# Patient Record
Sex: FEMALE | Race: WHITE | NOT HISPANIC OR LATINO | URBAN - METROPOLITAN AREA
[De-identification: names, ages, dates, MRNs, and addresses within clinical notes are randomized per-mention and may not be internally consistent; named-entity substitution may affect disease eponyms.]

---

## 2017-01-11 ENCOUNTER — INPATIENT (INPATIENT)
Facility: HOSPITAL | Age: 82
LOS: 0 days | Discharge: NURSING, REHAB OR LONG-TERM CARE | DRG: 884 | End: 2017-01-12
Attending: FAMILY MEDICINE | Admitting: FAMILY MEDICINE
Payer: COMMERCIAL

## 2017-01-11 VITALS
SYSTOLIC BLOOD PRESSURE: 138 MMHG | HEART RATE: 57 BPM | HEIGHT: 67 IN | TEMPERATURE: 97 F | DIASTOLIC BLOOD PRESSURE: 54 MMHG | RESPIRATION RATE: 16 BRPM | WEIGHT: 156.97 LBS

## 2017-01-11 DIAGNOSIS — J18.9 PNEUMONIA, UNSPECIFIED ORGANISM: ICD-10-CM

## 2017-01-11 DIAGNOSIS — R41.82 ALTERED MENTAL STATUS, UNSPECIFIED: ICD-10-CM

## 2017-01-11 DIAGNOSIS — I10 ESSENTIAL (PRIMARY) HYPERTENSION: ICD-10-CM

## 2017-01-11 DIAGNOSIS — Z29.9 ENCOUNTER FOR PROPHYLACTIC MEASURES, UNSPECIFIED: ICD-10-CM

## 2017-01-11 LAB
ALBUMIN SERPL ELPH-MCNC: 3.2 G/DL — LOW (ref 3.5–5)
ALP SERPL-CCNC: 60 U/L — SIGNIFICANT CHANGE UP (ref 40–120)
ALT FLD-CCNC: 18 U/L DA — SIGNIFICANT CHANGE UP (ref 10–60)
AMMONIA BLD-MCNC: 25 UMOL/L — SIGNIFICANT CHANGE UP (ref 11–32)
ANION GAP SERPL CALC-SCNC: 8 MMOL/L — SIGNIFICANT CHANGE UP (ref 5–17)
APPEARANCE UR: CLEAR — SIGNIFICANT CHANGE UP
APTT BLD: 28.9 SEC — SIGNIFICANT CHANGE UP (ref 27.5–37.4)
AST SERPL-CCNC: 26 U/L — SIGNIFICANT CHANGE UP (ref 10–40)
BASOPHILS # BLD AUTO: 0.1 K/UL — SIGNIFICANT CHANGE UP (ref 0–0.2)
BASOPHILS NFR BLD AUTO: 1.1 % — SIGNIFICANT CHANGE UP (ref 0–2)
BILIRUB SERPL-MCNC: 0.8 MG/DL — SIGNIFICANT CHANGE UP (ref 0.2–1.2)
BILIRUB UR-MCNC: NEGATIVE — SIGNIFICANT CHANGE UP
BUN SERPL-MCNC: 12 MG/DL — SIGNIFICANT CHANGE UP (ref 7–18)
CALCIUM SERPL-MCNC: 8.6 MG/DL — SIGNIFICANT CHANGE UP (ref 8.4–10.5)
CHLORIDE SERPL-SCNC: 93 MMOL/L — LOW (ref 96–108)
CO2 SERPL-SCNC: 28 MMOL/L — SIGNIFICANT CHANGE UP (ref 22–31)
COLOR SPEC: YELLOW — SIGNIFICANT CHANGE UP
CREAT SERPL-MCNC: 0.78 MG/DL — SIGNIFICANT CHANGE UP (ref 0.5–1.3)
DIFF PNL FLD: NEGATIVE — SIGNIFICANT CHANGE UP
EOSINOPHIL # BLD AUTO: 0.1 K/UL — SIGNIFICANT CHANGE UP (ref 0–0.5)
EOSINOPHIL NFR BLD AUTO: 1.2 % — SIGNIFICANT CHANGE UP (ref 0–6)
GLUCOSE SERPL-MCNC: 101 MG/DL — HIGH (ref 70–99)
GLUCOSE UR QL: NEGATIVE — SIGNIFICANT CHANGE UP
HCT VFR BLD CALC: 38.4 % — SIGNIFICANT CHANGE UP (ref 34.5–45)
HGB BLD-MCNC: 12.9 G/DL — SIGNIFICANT CHANGE UP (ref 11.5–15.5)
INR BLD: 1.11 RATIO — SIGNIFICANT CHANGE UP (ref 0.88–1.16)
KETONES UR-MCNC: NEGATIVE — SIGNIFICANT CHANGE UP
LEUKOCYTE ESTERASE UR-ACNC: NEGATIVE — SIGNIFICANT CHANGE UP
LYMPHOCYTES # BLD AUTO: 1.1 K/UL — SIGNIFICANT CHANGE UP (ref 1–3.3)
LYMPHOCYTES # BLD AUTO: 12.7 % — LOW (ref 13–44)
MCHC RBC-ENTMCNC: 28.5 PG — SIGNIFICANT CHANGE UP (ref 27–34)
MCHC RBC-ENTMCNC: 32.3 GM/DL — SIGNIFICANT CHANGE UP (ref 32–36)
MCV RBC AUTO: 88.3 FL — SIGNIFICANT CHANGE UP (ref 80–100)
MONOCYTES # BLD AUTO: 0.7 K/UL — SIGNIFICANT CHANGE UP (ref 0–0.9)
MONOCYTES NFR BLD AUTO: 8.5 % — SIGNIFICANT CHANGE UP (ref 2–14)
NEUTROPHILS # BLD AUTO: 6 K/UL — SIGNIFICANT CHANGE UP (ref 1.8–7.4)
NEUTROPHILS NFR BLD AUTO: 76.9 % — SIGNIFICANT CHANGE UP (ref 43–77)
NITRITE UR-MCNC: NEGATIVE — SIGNIFICANT CHANGE UP
NT-PROBNP SERPL-SCNC: 3084 PG/ML — HIGH (ref 0–450)
OSMOLALITY UR: 329 MOS/KG — SIGNIFICANT CHANGE UP (ref 50–1200)
PH UR: 7 — SIGNIFICANT CHANGE UP (ref 4.8–8)
PLATELET # BLD AUTO: 158 K/UL — SIGNIFICANT CHANGE UP (ref 150–400)
POTASSIUM SERPL-MCNC: 4 MMOL/L — SIGNIFICANT CHANGE UP (ref 3.5–5.3)
POTASSIUM SERPL-SCNC: 4 MMOL/L — SIGNIFICANT CHANGE UP (ref 3.5–5.3)
POTASSIUM UR-SCNC: 39 MMOL/L — SIGNIFICANT CHANGE UP (ref 25–125)
PROT SERPL-MCNC: 7 G/DL — SIGNIFICANT CHANGE UP (ref 6–8.3)
PROT UR-MCNC: 15
PROTHROM AB SERPL-ACNC: 12.5 SEC — SIGNIFICANT CHANGE UP (ref 10–13.1)
RBC # BLD: 4.35 M/UL — SIGNIFICANT CHANGE UP (ref 3.8–5.2)
RBC # FLD: 14.4 % — SIGNIFICANT CHANGE UP (ref 10.3–14.5)
SODIUM SERPL-SCNC: 129 MMOL/L — LOW (ref 135–145)
SODIUM UR-SCNC: 43 MMOL/L — SIGNIFICANT CHANGE UP (ref 40–220)
SP GR SPEC: 1.01 — SIGNIFICANT CHANGE UP (ref 1.01–1.02)
TROPONIN I SERPL-MCNC: <0.015 NG/ML — SIGNIFICANT CHANGE UP (ref 0–0.04)
TSH SERPL-MCNC: 2.67 UU/ML — SIGNIFICANT CHANGE UP (ref 0.34–4.82)
UROBILINOGEN FLD QL: NEGATIVE — SIGNIFICANT CHANGE UP
WBC # BLD: 8.7 K/UL — SIGNIFICANT CHANGE UP (ref 3.8–10.5)
WBC # FLD AUTO: 8.7 K/UL — SIGNIFICANT CHANGE UP (ref 3.8–10.5)

## 2017-01-11 PROCEDURE — 70450 CT HEAD/BRAIN W/O DYE: CPT | Mod: 26

## 2017-01-11 PROCEDURE — 71010: CPT | Mod: 26

## 2017-01-11 PROCEDURE — 99285 EMERGENCY DEPT VISIT HI MDM: CPT

## 2017-01-11 RX ORDER — SIMVASTATIN 20 MG/1
20 TABLET, FILM COATED ORAL AT BEDTIME
Qty: 0 | Refills: 0 | Status: DISCONTINUED | OUTPATIENT
Start: 2017-01-11 | End: 2017-01-12

## 2017-01-11 RX ORDER — DOCUSATE SODIUM 100 MG
100 CAPSULE ORAL
Qty: 0 | Refills: 0 | Status: DISCONTINUED | OUTPATIENT
Start: 2017-01-11 | End: 2017-01-12

## 2017-01-11 RX ORDER — ENOXAPARIN SODIUM 100 MG/ML
40 INJECTION SUBCUTANEOUS DAILY
Qty: 0 | Refills: 0 | Status: DISCONTINUED | OUTPATIENT
Start: 2017-01-11 | End: 2017-01-12

## 2017-01-11 RX ORDER — VALSARTAN 80 MG/1
1 TABLET ORAL
Qty: 0 | Refills: 0 | COMMUNITY

## 2017-01-11 RX ORDER — GABAPENTIN 400 MG/1
1 CAPSULE ORAL
Qty: 0 | Refills: 0 | COMMUNITY

## 2017-01-11 RX ORDER — ASPIRIN/CALCIUM CARB/MAGNESIUM 324 MG
81 TABLET ORAL DAILY
Qty: 0 | Refills: 0 | Status: DISCONTINUED | OUTPATIENT
Start: 2017-01-11 | End: 2017-01-12

## 2017-01-11 RX ORDER — FUROSEMIDE 40 MG
1 TABLET ORAL
Qty: 0 | Refills: 0 | COMMUNITY

## 2017-01-11 RX ORDER — GABAPENTIN 400 MG/1
300 CAPSULE ORAL AT BEDTIME
Qty: 0 | Refills: 0 | Status: DISCONTINUED | OUTPATIENT
Start: 2017-01-11 | End: 2017-01-12

## 2017-01-11 RX ORDER — SIMVASTATIN 20 MG/1
1 TABLET, FILM COATED ORAL
Qty: 0 | Refills: 0 | COMMUNITY

## 2017-01-11 RX ORDER — FUROSEMIDE 40 MG
20 TABLET ORAL DAILY
Qty: 0 | Refills: 0 | Status: DISCONTINUED | OUTPATIENT
Start: 2017-01-11 | End: 2017-01-11

## 2017-01-11 RX ORDER — CARVEDILOL PHOSPHATE 80 MG/1
1 CAPSULE, EXTENDED RELEASE ORAL
Qty: 0 | Refills: 0 | COMMUNITY

## 2017-01-11 RX ORDER — FUROSEMIDE 40 MG
20 TABLET ORAL ONCE
Qty: 0 | Refills: 0 | Status: COMPLETED | OUTPATIENT
Start: 2017-01-11 | End: 2017-01-11

## 2017-01-11 RX ORDER — SODIUM CHLORIDE 9 MG/ML
3 INJECTION INTRAMUSCULAR; INTRAVENOUS; SUBCUTANEOUS EVERY 8 HOURS
Qty: 0 | Refills: 0 | Status: DISCONTINUED | OUTPATIENT
Start: 2017-01-11 | End: 2017-01-12

## 2017-01-11 RX ORDER — DOCUSATE SODIUM 100 MG
1 CAPSULE ORAL
Qty: 0 | Refills: 0 | COMMUNITY

## 2017-01-11 RX ORDER — CARVEDILOL PHOSPHATE 80 MG/1
12.5 CAPSULE, EXTENDED RELEASE ORAL AT BEDTIME
Qty: 0 | Refills: 0 | Status: DISCONTINUED | OUTPATIENT
Start: 2017-01-11 | End: 2017-01-12

## 2017-01-11 RX ORDER — VALSARTAN 80 MG/1
160 TABLET ORAL DAILY
Qty: 0 | Refills: 0 | Status: DISCONTINUED | OUTPATIENT
Start: 2017-01-11 | End: 2017-01-12

## 2017-01-11 RX ORDER — FUROSEMIDE 40 MG
20 TABLET ORAL DAILY
Qty: 0 | Refills: 0 | Status: DISCONTINUED | OUTPATIENT
Start: 2017-01-11 | End: 2017-01-12

## 2017-01-11 RX ADMIN — SIMVASTATIN 20 MILLIGRAM(S): 20 TABLET, FILM COATED ORAL at 22:58

## 2017-01-11 RX ADMIN — SODIUM CHLORIDE 3 MILLILITER(S): 9 INJECTION INTRAMUSCULAR; INTRAVENOUS; SUBCUTANEOUS at 22:59

## 2017-01-11 RX ADMIN — Medication 20 MILLIGRAM(S): at 15:38

## 2017-01-11 RX ADMIN — SODIUM CHLORIDE 3 MILLILITER(S): 9 INJECTION INTRAMUSCULAR; INTRAVENOUS; SUBCUTANEOUS at 15:10

## 2017-01-11 RX ADMIN — Medication 100 MILLIGRAM(S): at 18:19

## 2017-01-11 RX ADMIN — GABAPENTIN 300 MILLIGRAM(S): 400 CAPSULE ORAL at 22:58

## 2017-01-11 RX ADMIN — CARVEDILOL PHOSPHATE 12.5 MILLIGRAM(S): 80 CAPSULE, EXTENDED RELEASE ORAL at 22:58

## 2017-01-11 NOTE — ED PROVIDER NOTE - CARE PLAN
Principal Discharge DX:	Pleural effusion  Secondary Diagnosis:	Hypotension, unspecified hypotension type  Secondary Diagnosis:	Altered mental status, unspecified altered mental status type Principal Discharge DX:	Pneumonia of left lower lobe due to infectious organism

## 2017-01-11 NOTE — H&P ADULT. - PROBLEM SELECTOR PLAN 1
patient is AO2-3 , able to understands and answer questions appropriately   Rule out infective causes of delirium   UA negative   CXR showed ILD vs PNA vs pulmonary congestion  Continue with levaquin for now, DC abx once PNA rule out by CT and procalcitonin   Elevated pro BNP , changed lasix to 20 IV , f/up CT chest for pulmonary congestion

## 2017-01-11 NOTE — ED PROVIDER NOTE - PHYSICAL EXAMINATION
Constitutional : Well-developed, well-nourished, no acute distress. Non-toxic appearance. AxO2; not oriented to place.   HENT: Normocephalic, atraumatic, external ears normal,  nares patent without drainage, oropharynx pink and moist, no oral exudates  Eyes: PERRLA, EOMI, conjunctiva clear, no scleral icterus  Neck: FROM, No tenderness, Supple, No meningismus, Trachea midline  Cardiovascular: Regular rate, regular rhythm. Normal S1/2. Systolic murmur best heard at L sternal border. No rubs, gallops  Thorax & Lungs: Normal respiratory effort. No chest tenderness. Clear to auscultation bilaterally. No crackles/wheezes/ronchi  Abdomen: normoactive bowel sounds, soft, non-distended, non-tender, no pulsatile masses  Skin: Warm/dry, no erythema, no rash  Back: No midline vertebral tenderness, no CVA tenderness  Extremities: Intact distal pulses, no edema, no tenderness, no cyanosis, no clubbing  Musculoskeletal: FROM x4. No tenderness to palpation, major deformities, or effusions noted  Neurologic: Alert & oriented x2 only, CN grossly intact, LUE 0/5, LLE 3/5 strength. RUE and RLE 5/5 strength. Contraction at L upper wrist. Sensation intact to light touch x4  Psychiatric: Appropriate Affect, judgment, mood, and insight

## 2017-01-11 NOTE — PATIENT PROFILE ADULT. - ABILITY TO HEAR (WITH HEARING AID OR HEARING APPLIANCE IF NORMALLY USED):
Mildly to Moderately Impaired: difficulty hearing in some environments or speaker may need to increase volume or speak distinctly/patient has hard hearing

## 2017-01-11 NOTE — H&P ADULT. - ASSESSMENT
86 F from Replaced by [facility] with PMH of  Hx of CVA (L-sided), aortic valve replacement, CHF, CAD, HTN, HLD, DM, hypothyroidism, and GERD sent in from [facility] for AMS and confusion. As per son at  bedside patient has history of age related forgetfulness but never been diagnosed with dementia . Patient currently AOx3 (with episodes of confusion and forgetfulness in answering questions ) , as per son patient is confused but close to her baseline . Patient denied any cough, fever, chest pain or SOB. Patient stated she had her broken teeth removal last Thursday and is not any antibiotics and has minimal pain. Patient denied any abdominal pain, constipation, diarrhea, burning or pain on micturition (incontinent wears diaper ). 86 F from [facility] with PMH of  Hx of CVA (L-sided), aortic valve replacement, CHF, CAD, HTN, HLD, DM, hypothyroidism, and GERD sent in from [facility] for AMS and confusion. As per son at  bedside patient has history of age related forgetfulness but never been diagnosed with dementia . Patient currently AOx3 (with episodes of confusion and forgetfulness in answering questions ) , as per son patient is confused but close to her baseline . Patient denied any cough, fever, chest pain or SOB. Patient stated she had her broken teeth removal last Thursday and is not any antibiotics and has minimal pain. Patient denied any abdominal pain, constipation, diarrhea, burning or pain on micturition (incontinent wears diaper ).  CXR showed  Left basilar density may represent pulmonary infiltrate and/or pleural effusion.     Reticular opacifications in both lungs may represent interstitial   pulmonary edema, interstitial pneumonia or chronic interstitial lung   disease. Clinical correlation is recommended 86 F from [facility] with PMH of  Hx of CVA (L-sided), aortic valve replacement, CHF, CAD, HTN, HLD, DM, hypothyroidism, and GERD sent in from [facility] for AMS and confusion. As per son at  bedside patient has history of age related forgetfulness but never been diagnosed with dementia . Patient currently AOx3 (with episodes of confusion and forgetfulness in answering questions ) , as per son patient is confused but close to her baseline . Patient denied any cough, fever, chest pain or SOB. Patient stated she had her broken teeth removal last Thursday and is not any antibiotics and has minimal pain. Patient denied any abdominal pain, constipation, diarrhea, burning or pain on micturition (incontinent wears diaper ).  CXR showed  Left basilar density may represent pulmonary infiltrate and/or pleural effusion. Reticular opacifications in both lungs may represent interstitial pulmonary edema, interstitial pneumonia or chronic interstitial lung disease. Clinical correlation is recommended. Patent has no clinical signs or symptoms of PNa, no cough , no fever, normal WBC , patient has likely ILD , will cover with levaquin for now and get CT chest , if negative for PNA , DC abx   Patient also has pitting peripheral edema , unknown history of heart failure , takes 20 of lasix, will change to IV lasix 20, f/up CT chest to rule out pleural effusion .

## 2017-01-11 NOTE — H&P ADULT. - HISTORY OF PRESENT ILLNESS
86 F from [facility] with PMH of  Hx of CVA (L-sided), aortic valve replacement, CHF, CAD, HTN, HLD, DM, hypothyroidism, and GERD sent in from [facility] for AMS and confusion. As per son at  bedside patient has history of age related forgetfulness but never been diagnosed with dementia . Patient currently AOx3 (with episodes of confusion and forgetfulness in answering questions ) , as per 86 F from [facility] with PMH of  Hx of CVA (L-sided), aortic valve replacement, CHF, CAD, HTN, HLD, DM, hypothyroidism, and GERD sent in from [facility] for AMS and confusion. As per son at  bedside patient has history of age related forgetfulness but never been diagnosed with dementia . Patient currently AOx3 (with episodes of confusion and forgetfulness in answering questions ) , as per son patient is confused but close to her baseline . Patient denied any cough, fever, chest pain or SOB. Patient stated she had her broken teeth removal last Thursday and is not any antibiotics and has minimal pain. Patient denied any abdominal pain, constipation, diarrhea, burning or pain on micturition (incontinent wears diaper ).

## 2017-01-11 NOTE — H&P ADULT. - ATTENDING COMMENTS
patient seen and examined. case fully discussed with  ER attending and medical resident. reviewed chief complaint. reviewed review of systems. reviewed list of medication,  reviewed physical exam. reviewed assessment and plan. agree with full H and P.

## 2017-01-11 NOTE — ED PROVIDER NOTE - DIAGNOSIS COUNSELING, MDM
conducted a detailed discussion... I had a detailed discussion with the patient and/or guardian regarding the historical points, exam findings, and any diagnostic results supporting the discharge/admit diagnosis. I had a detailed discussion with the patient and/or guardian regarding the historical points, exam findings, and any diagnostic results supporting the admission diagnosis. Pt educated on care and need for admission. Questions answered. Patient/Guardian shows understanding of admission information and agrees to follow.

## 2017-01-11 NOTE — ED PROVIDER NOTE - MEDICAL DECISION MAKING DETAILS
IV labs, EKG, CT-head, UA, disposition depending on workup possible admission. IV labs, EKG, CT-head, UA, disposition depending on workup possible admission. I had a detailed discussion with the patient and/or guardian regarding the historical points, exam findings, and any diagnostic results supporting the admission diagnosis. Pt educated on care and need for admission. Questions answered. Patient/Guardian shows understanding of admission information and agrees to follow.

## 2017-01-11 NOTE — PATIENT PROFILE ADULT. - VISION (WITH CORRECTIVE LENSES IF THE PATIENT USUALLY WEARS THEM):
Partially impaired: cannot see medication labels or newsprint, but can see obstacles in path, and the surrounding layout; can count fingers at arm's length/patient has cataract

## 2017-01-11 NOTE — ED PROVIDER NOTE - CONDUCTED A DETAILED DISCUSSION WITH PATIENT AND/OR GUARDIAN REGARDING, MDM
return to ED if symptoms worsen, persist or questions arise return to ED if symptoms worsen, persist or questions arise/lab results

## 2017-01-11 NOTE — ED PROVIDER NOTE - SECONDARY DIAGNOSIS.
Hypotension, unspecified hypotension type Altered mental status, unspecified altered mental status type

## 2017-01-11 NOTE — ED PROVIDER NOTE - OBJECTIVE STATEMENT
86 F w/ Hx of CVA, aortic valve replacement, CHF, CAD, HTN, HLD, DM, hypothyroidism, and GERD sent in from [facility] for AMS and confusion onset unknown. Last at baseline unknown. Pt is DNR, DNI. Pt denies CP, SOB, smoking, drinking,  or any other complaint. Allergies: penicillin, sulfa. HPI and ROS limited by pt's mentation. Finger stick: non-hypoglycemic. 86 F w/ Hx of CVA (L-sided), aortic valve replacement, CHF, CAD, HTN, HLD, DM, hypothyroidism, and GERD sent in from [facility] for AMS and confusion onset unknown. Last at baseline unknown. Pt is DNR, DNI. Pt denies CP, SOB, smoking, drinking,  or any other complaint. Allergies: penicillin, sulfa. HPI and ROS limited by pt's mentation. Finger stick: non-hypoglycemic.

## 2017-01-11 NOTE — ED PROVIDER NOTE - NS ED MD SCRIBE ATTENDING SCRIBE SECTIONS
HISTORY OF PRESENT ILLNESS/PAST MEDICAL/SURGICAL/SOCIAL HISTORY/HIV/VITAL SIGNS( Pullset)/PHYSICAL EXAM/REVIEW OF SYSTEMS/DISPOSITION

## 2017-01-12 VITALS — HEART RATE: 59 BPM | SYSTOLIC BLOOD PRESSURE: 135 MMHG | TEMPERATURE: 98 F | DIASTOLIC BLOOD PRESSURE: 59 MMHG

## 2017-01-12 LAB
24R-OH-CALCIDIOL SERPL-MCNC: 28.6 NG/ML — LOW (ref 30–100)
ANION GAP SERPL CALC-SCNC: 9 MMOL/L — SIGNIFICANT CHANGE UP (ref 5–17)
BUN SERPL-MCNC: 10 MG/DL — SIGNIFICANT CHANGE UP (ref 7–18)
CALCIUM SERPL-MCNC: 8.3 MG/DL — LOW (ref 8.4–10.5)
CHLORIDE SERPL-SCNC: 91 MMOL/L — LOW (ref 96–108)
CO2 SERPL-SCNC: 29 MMOL/L — SIGNIFICANT CHANGE UP (ref 22–31)
CREAT SERPL-MCNC: 0.65 MG/DL — SIGNIFICANT CHANGE UP (ref 0.5–1.3)
GLUCOSE SERPL-MCNC: 95 MG/DL — SIGNIFICANT CHANGE UP (ref 70–99)
HBA1C BLD-MCNC: 5.8 % — HIGH (ref 4–5.6)
HCT VFR BLD CALC: 34.7 % — SIGNIFICANT CHANGE UP (ref 34.5–45)
HGB BLD-MCNC: 11.9 G/DL — SIGNIFICANT CHANGE UP (ref 11.5–15.5)
MAGNESIUM SERPL-MCNC: 1.9 MG/DL — SIGNIFICANT CHANGE UP (ref 1.8–2.4)
MCHC RBC-ENTMCNC: 30 PG — SIGNIFICANT CHANGE UP (ref 27–34)
MCHC RBC-ENTMCNC: 34.2 GM/DL — SIGNIFICANT CHANGE UP (ref 32–36)
MCV RBC AUTO: 87.6 FL — SIGNIFICANT CHANGE UP (ref 80–100)
PHOSPHATE SERPL-MCNC: 3.8 MG/DL — SIGNIFICANT CHANGE UP (ref 2.5–4.5)
PLATELET # BLD AUTO: 141 K/UL — LOW (ref 150–400)
POTASSIUM SERPL-MCNC: 3.2 MMOL/L — LOW (ref 3.5–5.3)
POTASSIUM SERPL-SCNC: 3.2 MMOL/L — LOW (ref 3.5–5.3)
PROCALCITONIN SERPL-MCNC: <0.05 NG/ML — SIGNIFICANT CHANGE UP (ref 0–0.05)
RBC # BLD: 3.96 M/UL — SIGNIFICANT CHANGE UP (ref 3.8–5.2)
RBC # FLD: 13.8 % — SIGNIFICANT CHANGE UP (ref 10.3–14.5)
SODIUM SERPL-SCNC: 129 MMOL/L — LOW (ref 135–145)
TSH SERPL-MCNC: 1.36 UU/ML — SIGNIFICANT CHANGE UP (ref 0.34–4.82)
URATE UR-MCNC: 35.4 MG/DL — SIGNIFICANT CHANGE UP
VIT B12 SERPL-MCNC: 555 PG/ML — SIGNIFICANT CHANGE UP (ref 243–894)
WBC # BLD: 7.7 K/UL — SIGNIFICANT CHANGE UP (ref 3.8–10.5)
WBC # FLD AUTO: 7.7 K/UL — SIGNIFICANT CHANGE UP (ref 3.8–10.5)

## 2017-01-12 PROCEDURE — 87040 BLOOD CULTURE FOR BACTERIA: CPT

## 2017-01-12 PROCEDURE — 83036 HEMOGLOBIN GLYCOSYLATED A1C: CPT

## 2017-01-12 PROCEDURE — 83735 ASSAY OF MAGNESIUM: CPT

## 2017-01-12 PROCEDURE — 85027 COMPLETE CBC AUTOMATED: CPT

## 2017-01-12 PROCEDURE — 84100 ASSAY OF PHOSPHORUS: CPT

## 2017-01-12 PROCEDURE — 99285 EMERGENCY DEPT VISIT HI MDM: CPT | Mod: 25

## 2017-01-12 PROCEDURE — 84133 ASSAY OF URINE POTASSIUM: CPT

## 2017-01-12 PROCEDURE — 84300 ASSAY OF URINE SODIUM: CPT

## 2017-01-12 PROCEDURE — 96374 THER/PROPH/DIAG INJ IV PUSH: CPT

## 2017-01-12 PROCEDURE — 80053 COMPREHEN METABOLIC PANEL: CPT

## 2017-01-12 PROCEDURE — 93005 ELECTROCARDIOGRAM TRACING: CPT

## 2017-01-12 PROCEDURE — 71250 CT THORAX DX C-: CPT | Mod: 26

## 2017-01-12 PROCEDURE — 83880 ASSAY OF NATRIURETIC PEPTIDE: CPT

## 2017-01-12 PROCEDURE — 84484 ASSAY OF TROPONIN QUANT: CPT

## 2017-01-12 PROCEDURE — 70450 CT HEAD/BRAIN W/O DYE: CPT

## 2017-01-12 PROCEDURE — 71250 CT THORAX DX C-: CPT

## 2017-01-12 PROCEDURE — 81001 URINALYSIS AUTO W/SCOPE: CPT

## 2017-01-12 PROCEDURE — 83935 ASSAY OF URINE OSMOLALITY: CPT

## 2017-01-12 PROCEDURE — 84443 ASSAY THYROID STIM HORMONE: CPT

## 2017-01-12 PROCEDURE — 96375 TX/PRO/DX INJ NEW DRUG ADDON: CPT

## 2017-01-12 PROCEDURE — 87186 SC STD MICRODIL/AGAR DIL: CPT

## 2017-01-12 PROCEDURE — 84145 PROCALCITONIN (PCT): CPT

## 2017-01-12 PROCEDURE — 84560 ASSAY OF URINE/URIC ACID: CPT

## 2017-01-12 PROCEDURE — 82306 VITAMIN D 25 HYDROXY: CPT

## 2017-01-12 PROCEDURE — 87086 URINE CULTURE/COLONY COUNT: CPT

## 2017-01-12 PROCEDURE — 85730 THROMBOPLASTIN TIME PARTIAL: CPT

## 2017-01-12 PROCEDURE — 85610 PROTHROMBIN TIME: CPT

## 2017-01-12 PROCEDURE — 71045 X-RAY EXAM CHEST 1 VIEW: CPT

## 2017-01-12 PROCEDURE — 82140 ASSAY OF AMMONIA: CPT

## 2017-01-12 PROCEDURE — 80048 BASIC METABOLIC PNL TOTAL CA: CPT

## 2017-01-12 PROCEDURE — 82607 VITAMIN B-12: CPT

## 2017-01-12 RX ORDER — BENZOCAINE AND MENTHOL 5; 1 G/100ML; G/100ML
1 LIQUID ORAL EVERY 4 HOURS
Qty: 0 | Refills: 0 | Status: DISCONTINUED | OUTPATIENT
Start: 2017-01-12 | End: 2017-01-12

## 2017-01-12 RX ORDER — POTASSIUM CHLORIDE 20 MEQ
40 PACKET (EA) ORAL ONCE
Qty: 0 | Refills: 0 | Status: COMPLETED | OUTPATIENT
Start: 2017-01-12 | End: 2017-01-12

## 2017-01-12 RX ORDER — POTASSIUM CHLORIDE 20 MEQ
10 PACKET (EA) ORAL
Qty: 0 | Refills: 0 | Status: DISCONTINUED | OUTPATIENT
Start: 2017-01-12 | End: 2017-01-12

## 2017-01-12 RX ADMIN — Medication 81 MILLIGRAM(S): at 11:33

## 2017-01-12 RX ADMIN — SODIUM CHLORIDE 3 MILLILITER(S): 9 INJECTION INTRAMUSCULAR; INTRAVENOUS; SUBCUTANEOUS at 15:09

## 2017-01-12 RX ADMIN — Medication 40 MILLIEQUIVALENT(S): at 11:33

## 2017-01-12 RX ADMIN — Medication 100 MILLIGRAM(S): at 05:44

## 2017-01-12 RX ADMIN — Medication 100 MILLIGRAM(S): at 17:12

## 2017-01-12 RX ADMIN — SODIUM CHLORIDE 3 MILLILITER(S): 9 INJECTION INTRAMUSCULAR; INTRAVENOUS; SUBCUTANEOUS at 05:45

## 2017-01-12 RX ADMIN — BENZOCAINE AND MENTHOL 1 LOZENGE: 5; 1 LIQUID ORAL at 02:26

## 2017-01-12 RX ADMIN — VALSARTAN 160 MILLIGRAM(S): 80 TABLET ORAL at 05:44

## 2017-01-12 RX ADMIN — ENOXAPARIN SODIUM 40 MILLIGRAM(S): 100 INJECTION SUBCUTANEOUS at 11:33

## 2017-01-12 RX ADMIN — Medication 20 MILLIGRAM(S): at 05:44

## 2017-01-12 NOTE — DISCHARGE NOTE ADULT - PATIENT PORTAL LINK FT
“You can access the [facility] Patient Portal, offered by [facility], by registering with the following website: [email or web address]”

## 2017-01-12 NOTE — DISCHARGE NOTE ADULT - MEDICATION SUMMARY - MEDICATIONS TO TAKE
I will START or STAY ON the medications listed below when I get home from the hospital:    Diovan 160 mg oral capsule  -- 1 cap(s) by mouth once a day  -- Indication: For Hypertension    Neurontin 300 mg oral capsule  -- 1 tab(s) by mouth once a day (at bedtime)  -- Indication: For Neuropathic pain    Zocor 20 mg oral tablet  -- 1 tab(s) by mouth once a day (at bedtime)  -- Indication: For Hyperlipidaemia    Coreg 12.5 mg oral tablet  -- 1 tab(s) by mouth once a day (at bedtime)  -- Indication: For Hypertension    Lasix 20 mg oral tablet  -- 1 tab(s) by mouth once a day  -- Indication: For CHF    Colace 100 mg oral capsule  -- 1 cap(s) by mouth 2 times a day  -- Indication: For Constipation    Systane Preservative Free ophthalmic solution  -- 1 drop(s) to each affected eye 2 times a day  -- Indication: For Dry eyes

## 2017-01-12 NOTE — DISCHARGE NOTE ADULT - CARE PLAN
Principal Discharge DX:	Altered mental status  Goal:	Long-term management of likely dementia.  Instructions for follow-up, activity and diet:	Patient is AAOx3 and likely has dementia with periods of waxing and waning. Please continue monitoring patient regarding daily health maintenance and have patient be involved in social activities, to keep her mental health active.  Secondary Diagnosis:	HTN (hypertension)  Goal:	Long-term management of hypertension.  Instructions for follow-up, activity and diet:	Please continue with outpatient medications and eat a diet low in salt

## 2017-01-12 NOTE — DISCHARGE NOTE ADULT - PLAN OF CARE
Long-term management of likely dementia. Patient is AAOx3 and likely has dementia with periods of waxing and waning. Please continue monitoring patient regarding daily health maintenance and have patient be involved in social activities, to keep her mental health active. Long-term management of hypertension. Please continue with outpatient medications and eat a diet low in salt

## 2017-01-12 NOTE — DISCHARGE NOTE ADULT - HOSPITAL COURSE
Admission HPI:   86 F from [facility] with PMH of  Hx of CVA (L-sided), aortic valve replacement, CHF, CAD, HTN, HLD, DM, hypothyroidism, and GERD sent in from [facility] for AMS and confusion. As per son at  bedside patient has history of age related forgetfulness but never been diagnosed with dementia . Patient currently AOx3 (with episodes of confusion and forgetfulness in answering questions ) , as per son patient is confused but close to her baseline . Patient denied any cough, fever, chest pain or SOB. Patient stated she had her broken teeth removal last Thursday and is not any antibiotics and has minimal pain. Patient denied any abdominal pain, constipation, diarrhea, burning or pain on micturition (incontinent wears diaper ).    Hospital Course:  - Patient is afebrile with no leukocytosis and no evidence of SIRS.   - UA, CXR, CT head, and the CT chest are negative for infection/ischaemia/haemorrhage/other acute pathology.   - Patient's home medications restarted upon admission.   - She was upon Levaquin 500mg IVPB j24St for 2 doses for pneumonia, however Levaquin is discontinued as there is no evidence of pneumonia.   - Patient is AAOx3. Confusion is most likely secondary to patient's baseline of dementia with waxing and waning moments.   - No need for a Psychiatry evaluation, as per attending.

## 2017-01-13 LAB
-  AMPICILLIN: SIGNIFICANT CHANGE UP
-  CIPROFLOXACIN: SIGNIFICANT CHANGE UP
-  NITROFURANTOIN: SIGNIFICANT CHANGE UP
-  TETRACYCLINE: SIGNIFICANT CHANGE UP
-  VANCOMYCIN: SIGNIFICANT CHANGE UP
CULTURE RESULTS: SIGNIFICANT CHANGE UP
METHOD TYPE: SIGNIFICANT CHANGE UP
ORGANISM # SPEC MICROSCOPIC CNT: SIGNIFICANT CHANGE UP
ORGANISM # SPEC MICROSCOPIC CNT: SIGNIFICANT CHANGE UP
SPECIMEN SOURCE: SIGNIFICANT CHANGE UP

## 2017-01-17 LAB
CULTURE RESULTS: SIGNIFICANT CHANGE UP
CULTURE RESULTS: SIGNIFICANT CHANGE UP
SPECIMEN SOURCE: SIGNIFICANT CHANGE UP
SPECIMEN SOURCE: SIGNIFICANT CHANGE UP

## 2017-01-20 DIAGNOSIS — K21.9 GASTRO-ESOPHAGEAL REFLUX DISEASE WITHOUT ESOPHAGITIS: ICD-10-CM

## 2017-01-20 DIAGNOSIS — Z88.2 ALLERGY STATUS TO SULFONAMIDES: ICD-10-CM

## 2017-01-20 DIAGNOSIS — F03.90 UNSPECIFIED DEMENTIA, UNSPECIFIED SEVERITY, WITHOUT BEHAVIORAL DISTURBANCE, PSYCHOTIC DISTURBANCE, MOOD DISTURBANCE, AND ANXIETY: ICD-10-CM

## 2017-01-20 DIAGNOSIS — I11.0 HYPERTENSIVE HEART DISEASE WITH HEART FAILURE: ICD-10-CM

## 2017-01-20 DIAGNOSIS — Z95.2 PRESENCE OF PROSTHETIC HEART VALVE: ICD-10-CM

## 2017-01-20 DIAGNOSIS — E78.5 HYPERLIPIDEMIA, UNSPECIFIED: ICD-10-CM

## 2017-01-20 DIAGNOSIS — Z88.0 ALLERGY STATUS TO PENICILLIN: ICD-10-CM

## 2017-01-20 DIAGNOSIS — E11.9 TYPE 2 DIABETES MELLITUS WITHOUT COMPLICATIONS: ICD-10-CM

## 2017-01-20 DIAGNOSIS — I69.354 HEMIPLEGIA AND HEMIPARESIS FOLLOWING CEREBRAL INFARCTION AFFECTING LEFT NON-DOMINANT SIDE: ICD-10-CM

## 2017-01-20 DIAGNOSIS — E03.9 HYPOTHYROIDISM, UNSPECIFIED: ICD-10-CM

## 2017-01-20 DIAGNOSIS — I25.10 ATHEROSCLEROTIC HEART DISEASE OF NATIVE CORONARY ARTERY WITHOUT ANGINA PECTORIS: ICD-10-CM

## 2017-01-20 DIAGNOSIS — I50.9 HEART FAILURE, UNSPECIFIED: ICD-10-CM

## 2017-01-20 DIAGNOSIS — J84.9 INTERSTITIAL PULMONARY DISEASE, UNSPECIFIED: ICD-10-CM

## 2017-01-20 DIAGNOSIS — R41.82 ALTERED MENTAL STATUS, UNSPECIFIED: ICD-10-CM

## 2017-01-20 DIAGNOSIS — R32 UNSPECIFIED URINARY INCONTINENCE: ICD-10-CM

## 2019-05-14 NOTE — PATIENT PROFILE ADULT. - NS TRANSFER PATIENT BELONGINGS
Subjective:       Patient ID:  Sarai Bashir is a 72 y.o. female who presents for   Chief Complaint   Patient presents with    Dry Skin     both lower legs    Spot     R breast     HPI    New patient here today for dry skin on both lower legs since winter time, dry and scaly, has been using Lilian OTC lotion seems to be better.  Spot on the R breast, noticed about 5 weeks ago, raised, no tx to site.     Review of Systems   Constitutional: Negative for fever, chills and fatigue.   HENT: Negative for congestion, sore throat and mouth sores.    Eyes: Negative for itching, eye watering and eye irritation.   Respiratory: Negative for cough and shortness of breath.    Musculoskeletal: Negative for joint swelling and arthralgias.   Skin: Positive for dry skin. Negative for itching and rash.   Hematologic/Lymphatic: Does not bruise/bleed easily.        Objective:    Physical Exam   Constitutional: She appears well-developed and well-nourished. No distress.   Eyes: No conjunctival no injection.   Cardiovascular: There is no local extremity swelling and no dependent edema.     Neurological: She is alert and oriented to person, place, and time. She is not disoriented.   Psychiatric: She has a normal mood and affect.   Skin:   Areas Examined (abnormalities noted in diagram):   Head / Face Inspection Performed  Neck Inspection Performed  Chest / Axilla Inspection Performed  RUE Inspected  LUE Inspection Performed  RLE Inspected  LLE Inspection Performed              Diagram Legend     Erythematous scaling macule/papule c/w actinic keratosis       Vascular papule c/w angioma      Pigmented verrucoid papule/plaque c/w seborrheic keratosis      Yellow umbilicated papule c/w sebaceous hyperplasia      Irregularly shaped tan macule c/w lentigo     1-2 mm smooth white papules consistent with Milia      Movable subcutaneous cyst with punctum c/w epidermal inclusion cyst      Subcutaneous movable cyst c/w pilar cyst      Firm  pink to brown papule c/w dermatofibroma      Pedunculated fleshy papule(s) c/w skin tag(s)      Evenly pigmented macule c/w junctional nevus     Mildly variegated pigmented, slightly irregular-bordered macule c/w mildly atypical nevus      Flesh colored to evenly pigmented papule c/w intradermal nevus       Pink pearly papule/plaque c/w basal cell carcinoma      Erythematous hyperkeratotic cursted plaque c/w SCC      Surgical scar with no sign of skin cancer recurrence      Open and closed comedones      Inflammatory papules and pustules      Verrucoid papule consistent consistent with wart     Erythematous eczematous patches and plaques     Dystrophic onycholytic nail with subungual debris c/w onychomycosis     Umbilicated papule    Erythematous-base heme-crusted tan verrucoid plaque consistent with inflamed seborrheic keratosis     Erythematous Silvery Scaling Plaque c/w Psoriasis     See annotation      Assessment / Plan:        Eczema, unspecified type  -     fluocinonide 0.05% (LIDEX) 0.05 % cream; Apply topically 2 (two) times daily. Rash areas prn.  Dispense: 60 g; Refill: 0  Discussed with patient the etiology and pathogenesis of the disease or skin lesion(s) and possible treatments and aggravators.    Good skin care regimen discussed including limiting to one bath or shower/day, using lukewarm water with mild soap and moisturizing cream to skin 1 - 2x/day. Brochure was provided and reviewed with patient.  Reviewed with patient different treatment options and associated risks.  Proper application of medications and or care for affected area(s) and condition(s) reviewed.  Instructed patient to use petroleum jelly at least daily on affected areas.  Discussed with patient to use organic coconut oil or pure shea butter at least daily for moisturization for the body and organic jojoba oil at least daily for the face.  Instructed patient to use mild soaps like glycerin bar soap for washing the face and body.  Can  also consider avoiding applying on body except for armpits, groin, and soles.    Seborrheic keratosis  Discussed with patient the benign nature of these lesions and that no treatment is indicated.      Hyperpigmentation  Discussed with patient the benign nature of these lesions and that no treatment is indicated.    Discussed with the patient the risk of color scars or hyperpigmentation that could take months to resolve.             Follow up in about 2 weeks (around 5/28/2019).   Clothing

## 2020-08-12 NOTE — ED PROVIDER NOTE - CHPI ED SYMPTOMS NEG
Kathleen Gómez RN [facility] Coordinator [phone]. Received Secure Chat from Charlotte PEREIRA pt getting blood  Transfusion this morning will be able to transport home until later today. Spoke with pts son Chandler explained delay in discharge time today and need to change home hospice admission time. Chandler agreeable to pt DC today with home hospice admission scheduled for 8/13, hospice RN to arrive at home between 9-10 am. Updated Charlotte PEREIRA & staff RN Jay via Secure Chat. Will continue to follow. Thank you for the referral.      no shortness of breath, no chest pain

## 2021-01-08 NOTE — H&P ADULT. - RESPIRATORY AND THORAX
Authored by Gee Gutiérrez MD ([phone]) [facility]    Patient is a 49y old  Female who presents with a chief complaint of abdominal pain (07 Jan 2021 20:05)    SUBJECTIVE / OVERNIGHT EVENTS:    ADDITIONAL REVIEW OF SYSTEMS:    MEDICATIONS  (STANDING):  acetylcysteine IVPB 7 Gram(s) IV Intermittent <User Schedule>  albumin human 25% IVPB 100 milliLiter(s) IV Intermittent every 8 hours  atovaquone Suspension 1500 milliGRAM(s) Oral daily  cefepime   IVPB 1000 milliGRAM(s) IV Intermittent every 8 hours  chlorhexidine 2% Cloths 1 Application(s) Topical daily  dolutegravir 50 milliGRAM(s) Oral daily  emtricitabine 200 mG/tenofovir 300 mG (TRUVADA) 1 Tablet(s) Oral daily  furosemide   Injectable 40 milliGRAM(s) IV Push every 12 hours  midodrine. 30 milliGRAM(s) Oral three times a day  pantoprazole    Tablet 40 milliGRAM(s) Oral before breakfast  simethicone 80 milliGRAM(s) Chew four times a day    MEDICATIONS  (PRN):  aluminum hydroxide/magnesium hydroxide/simethicone Suspension 30 milliLiter(s) Oral every 6 hours PRN Dyspepsia  ondansetron Injectable 4 milliGRAM(s) IV Push every 8 hours PRN Nausea and/or Vomiting    I&O's Summary    07 Jan 2021 07:01  -  08 Jan 2021 07:00  --------------------------------------------------------  IN: 1740 mL / OUT: 800 mL / NET: 940 mL    PHYSICAL EXAM:  Vital Signs Last 24 Hrs  T(C): 36.7 (08 Jan 2021 05:26), Max: 37.2 (07 Jan 2021 23:12)  T(F): 98.1 (08 Jan 2021 05:26), Max: 98.9 (07 Jan 2021 23:12)  HR: 117 (08 Jan 2021 05:26) (96 - 117)  BP: 103/67 (08 Jan 2021 05:26) (92/63 - 128/80)  BP(mean): --  RR: 18 (08 Jan 2021 05:26) (18 - 20)  SpO2: 95% (08 Jan 2021 05:26) (95% - 100%)    GENERAL: No acute distress, well-developed  HEAD:  Atraumatic, Normocephalic  EYES: EOMI, PERRLA, conjunctiva and sclera clear  NECK: Supple, no lymphadenopathy, no JVD  CHEST/LUNG: CTAB; No wheezes, rales, or rhonchi  HEART: Regular rate and rhythm; No murmurs, rubs, or gallops  ABDOMEN: Soft, non-tender, non-distended; normal bowel sounds, no organomegaly  EXTREMITIES:  2+ peripheral pulses b/l, No clubbing, cyanosis, or edema  NEUROLOGY: A&O x 3, no focal deficits  SKIN: No rashes or lesions    LABS:                        7.5    7.33  )-----------( 71       ( 07 Jan 2021 19:45 )             21.7     01-07    135  |  102  |  18  ----------------------------<  105<H>  3.5   |  25  |  0.77    Ca    7.6<L>      07 Jan 2021 19:46  Phos  3.2     01-07  Mg     2.0     01-07    TPro  4.1<L>  /  Alb  2.4<L>  /  TBili  16.1<H>  /  DBili  x   /  AST  301<H>  /  ALT  89<H>  /  AlkPhos  63  01-07    PT/INR - ( 07 Jan 2021 19:45 )   PT: 34.6 sec;   INR: 3.04 ratio      PTT - ( 07 Jan 2021 19:45 )  PTT:70.1 sec    Culture - Acid Fast - Body Fluid w/Smear (collected 05 Jan 2021 22:33)  Source: .Body Fluid Peritoneal    Culture - Fungal, Body Fluid (collected 05 Jan 2021 22:32)  Source: .Body Fluid Peritoneal Fluid  Preliminary Report (06 Jan 2021 07:55):    Testing in progress    Culture - Body Fluid with Gram Stain (collected 05 Jan 2021 22:26)  Source: .Body Fluid Peritoneal Fluid  Preliminary Report (06 Jan 2021 15:53):    No growth Authored by Gee Gutiérrez MD ([phone]) [facility]    Patient is a 49y old  Female who presents with a chief complaint of abdominal pain (07 Jan 2021 20:05)    SUBJECTIVE / OVERNIGHT EVENTS: NAEON. This am pt notes she feels well and diuresed "a lot" last night. Denies HA, cp, SOB, abd pain, dysuria, hematuria, diarrhea, constipation    MEDICATIONS  (STANDING):  acetylcysteine IVPB 7 Gram(s) IV Intermittent <User Schedule>  albumin human 25% IVPB 100 milliLiter(s) IV Intermittent every 8 hours  atovaquone Suspension 1500 milliGRAM(s) Oral daily  cefepime   IVPB 1000 milliGRAM(s) IV Intermittent every 8 hours  chlorhexidine 2% Cloths 1 Application(s) Topical daily  dolutegravir 50 milliGRAM(s) Oral daily  emtricitabine 200 mG/tenofovir 300 mG (TRUVADA) 1 Tablet(s) Oral daily  furosemide   Injectable 40 milliGRAM(s) IV Push every 12 hours  midodrine. 30 milliGRAM(s) Oral three times a day  pantoprazole    Tablet 40 milliGRAM(s) Oral before breakfast  simethicone 80 milliGRAM(s) Chew four times a day    MEDICATIONS  (PRN):  aluminum hydroxide/magnesium hydroxide/simethicone Suspension 30 milliLiter(s) Oral every 6 hours PRN Dyspepsia  ondansetron Injectable 4 milliGRAM(s) IV Push every 8 hours PRN Nausea and/or Vomiting    I&O's Summary    07 Jan 2021 07:01  -  08 Jan 2021 07:00  --------------------------------------------------------  IN: 1740 mL / OUT: 800 mL / NET: 940 mL    PHYSICAL EXAM:  Vital Signs Last 24 Hrs  T(C): 36.7 (08 Jan 2021 05:26), Max: 37.2 (07 Jan 2021 23:12)  T(F): 98.1 (08 Jan 2021 05:26), Max: 98.9 (07 Jan 2021 23:12)  HR: 117 (08 Jan 2021 05:26) (96 - 117)  BP: 103/67 (08 Jan 2021 05:26) (92/63 - 128/80)  BP(mean): --  RR: 18 (08 Jan 2021 05:26) (18 - 20)  SpO2: 95% (08 Jan 2021 05:26) (95% - 100%)    GENERAL: No acute distress  HEAD: Atraumatic, Normocephalic  EYES: +Scleral icterus. EOMI, PERRLA  NECK: Supple, no lymphadenopathy, no JVD  CHEST/LUNG: CTAB; No wheezes, rales, or rhonchi  HEART: +Tachycardic. No murmurs, rubs, or gallops  ABDOMEN: +Distended. Soft, non-tender; normal bowel sounds, no organomegaly  EXTREMITIES: 2+ peripheral pulses b/l, No clubbing, cyanosis, or edema  NEUROLOGY: A&O x 3, no focal deficits  SKIN: +Jaundiced. No rashes or lesions    LABS:                        7.5    7.33  )-----------( 71       ( 07 Jan 2021 19:45 )             21.7     01-07    135  |  102  |  18  ----------------------------<  105<H>  3.5   |  25  |  0.77    Ca    7.6<L>      07 Jan 2021 19:46  Phos  3.2     01-07  Mg     2.0     01-07    TPro  4.1<L>  /  Alb  2.4<L>  /  TBili  16.1<H>  /  DBili  x   /  AST  301<H>  /  ALT  89<H>  /  AlkPhos  63  01-07    PT/INR - ( 07 Jan 2021 19:45 )   PT: 34.6 sec;   INR: 3.04 ratio      PTT - ( 07 Jan 2021 19:45 )  PTT:70.1 sec    Culture - Acid Fast - Body Fluid w/Smear (collected 05 Jan 2021 22:33)  Source: .Body Fluid Peritoneal    Culture - Fungal, Body Fluid (collected 05 Jan 2021 22:32)  Source: .Body Fluid Peritoneal Fluid  Preliminary Report (06 Jan 2021 07:55):    Testing in progress    Culture - Body Fluid with Gram Stain (collected 05 Jan 2021 22:26)  Source: .Body Fluid Peritoneal Fluid  Preliminary Report (06 Jan 2021 15:53):    No growth details…